# Patient Record
Sex: FEMALE
[De-identification: names, ages, dates, MRNs, and addresses within clinical notes are randomized per-mention and may not be internally consistent; named-entity substitution may affect disease eponyms.]

---

## 2022-01-17 ENCOUNTER — HOSPITAL ENCOUNTER (EMERGENCY)
Dept: HOSPITAL 97 - ER | Age: 67
Discharge: HOME | End: 2022-01-17
Payer: COMMERCIAL

## 2022-01-17 VITALS — OXYGEN SATURATION: 100 % | DIASTOLIC BLOOD PRESSURE: 83 MMHG | SYSTOLIC BLOOD PRESSURE: 165 MMHG | TEMPERATURE: 98 F

## 2022-01-17 DIAGNOSIS — S80.211A: ICD-10-CM

## 2022-01-17 DIAGNOSIS — S20.212A: ICD-10-CM

## 2022-01-17 DIAGNOSIS — S42.202A: ICD-10-CM

## 2022-01-17 DIAGNOSIS — I10: ICD-10-CM

## 2022-01-17 DIAGNOSIS — S27.329A: Primary | ICD-10-CM

## 2022-01-17 DIAGNOSIS — Z88.1: ICD-10-CM
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
## 2022-01-17 DIAGNOSIS — Z88.2: ICD-10-CM

## 2022-01-17 PROCEDURE — 70450 CT HEAD/BRAIN W/O DYE: CPT

## 2022-01-17 PROCEDURE — 99283 EMERGENCY DEPT VISIT LOW MDM: CPT

## 2022-01-17 PROCEDURE — 71045 X-RAY EXAM CHEST 1 VIEW: CPT

## 2022-01-17 NOTE — RAD REPORT
EXAM DESCRIPTION:  RAD - Ribs  Left - 1/17/2022 1:29 pm

 

CLINICAL HISTORY:  Fall, left shoulder and left ribcage pain

 

COMPARISON:  Single-view chest same date, left shoulder same date

 

FINDINGS:  No displaced rib fracture is seen and no non-displaced rib fractures confirmed. Patient ha
s prominent costochondral calcifications. No aggressive rib lesion. No underlying pneumothorax, effus
ion, infiltrate or pulmonary contusion.

 

Proximal left humerus fracture is detailed in separate imaging.

 

IMPRESSION:  No displaced rib fracture present and no nondisplaced rib fracture confirmed.

## 2022-01-17 NOTE — RAD REPORT
EXAM DESCRIPTION:  CT - Head Brain Wo Cont - 1/17/2022 2:57 pm

 

CLINICAL HISTORY:  PAIN

 

COMPARISON:  No comparisonsNo comparisons

 

TECHNIQUE:  Axial 5 mm thick images of the head were obtained without IV contrast.

 

All CT scans are performed using dose optimization technique as appropriate and may include automated
 exposure control or mA/KV adjustment according to patient size.

 

FINDINGS:  No intracranial hemorrhage, mass, edema or shift of mid-line structures. No acute infarcti
on changes seen. No abnormal extra-axial fluid collections. Ventricles are normal. Patient very minim
al cerebral white matter chronic ischemic change. No measurable atrophy

 

Mastoid air cells are clear. Mucosal thickening and air-fluid level present in the right maxillary si
nus with no additional finding that would indicate a trauma etiology. No globe or orbital content abn
ormality.

 

No acute bony findings.

 

 

IMPRESSION:  No acute intracranial finding identifiable.

 

Air-fluid level in the right maxillary sinus probably from sinusitis. No additional findings that wou
ld indicate trauma etiology.

## 2022-01-17 NOTE — RAD REPORT
EXAM DESCRIPTION:  RAD - Shoulder  Left 2 View - 1/17/2022 1:29 pm

 

CLINICAL HISTORY:  PAIN

 

COMPARISON:  Chest Single View dated 1/17/2022; Ribs  Left dated 1/17/2022

 

TECHNIQUE:  AP and transthoracic views of the left shoulder obtained.

 

FINDINGS:  Proximal left humerus fracture is present without dislocation of the humeral head. Fractur
e involves the greater tuberosity laterally with the fracture line tracking medially and inferiorly. 
Medial margin of the fracture is approximately 15 mm below the surgical neck.

 

No pathologic bone changes are seen. Sternoclavicular joint is intact. No abnormal soft tissue calcif
ications.

 

There is minimal angulation of the distal fracture fragment. No measurable distraction or displacemen
t.

 

IMPRESSION:  Proximal left humerus fracture as detailed.

## 2022-01-17 NOTE — EDPHYS
Physician Documentation                                                                           

 [facility]                                                                 

Name: Sally Cantu                                                                                 

Age: 66 yrs                                                                                       

Sex: Female                                                                                       

: 1955                                                                                   

MRN: [id number]                                                                                   

Arrival Date: 2022                                                                          

Time: 10:46                                                                                       

Account#: [id number]                                                                            

Bed 30                                                                                            

Private MD:                                                                                       

ED Physician Azael Andre                                                                      

HPI:                                                                                              

                                                                                             

14:23 This 66 yrs old  Female presents to ER via Ambulatory with complaints of Left   pm1 

      Arm Injury.                                                                                 

14:23 The patient or guardian complains of pain, that is acute. The complaints affect the     pm1 

      left upper arm. Context: The problem was sustained outdoors, resulted from Tripped and      

      fell forward onto her left side. Presenting with pain and abrasion to right knee, left      

      upper arm pain, and left rib pain. Patient reports she also hit her face, knocking off      

      her glasses. She reports LOC. Onset: The symptoms/episode began/occurred just prior to      

      arrival. Treatment prior to arrival includes: sling, by EMS. Modifying factors: The         

      symptoms are alleviated by remaining still. Associated signs and symptoms: Pertinent        

      positives:. Severity of symptoms: in the emergency department the symptoms are              

      unchanged. The patient has not experienced similar symptoms in the past. The patient        

      has not recently seen a physician.                                                          

                                                                                                  

Historical:                                                                                       

- Allergies:                                                                                      

12:25 Cipro;                                                                                  vg1 

12:25 Sulfa (Sulfonamide Antibiotics);                                                        vg1 

- Home Meds:                                                                                      

12:25 losartan oral [Active]; Metformin Oral [Active]; Lovastatin Oral [Active]; Bactrim DS   vg1 

      Oral [Active];                                                                              

- PMHx:                                                                                           

12:25 Hypertensive disorder; Diabetes mellitus;                                               vg1 

- PSHx:                                                                                           

12:25 None;                                                                                   vg1 

                                                                                                  

- Immunization history:: Client reports receiving the 2nd dose of the Covid vaccine.              

- Social history:: Smoking status: Patient denies any tobacco usage or history of.                

                                                                                                  

                                                                                                  

ROS:                                                                                              

14:23 Constitutional: Negative for fever, chills, and weight loss, Eyes: Negative for injury, pm1 

      pain, redness, and discharge, Neck: Negative for injury, pain, and swelling,                

      Cardiovascular: Negative for chest pain, palpitations, and edema, Respiratory: Negative     

      for shortness of breath, cough, wheezing, and pleuritic chest pain, Abdomen/GI:             

      Negative for abdominal pain, nausea, vomiting, diarrhea, and constipation, Back:            

      Negative for injury and pain.                                                               

14:23 MS/extremity: Positive for pain, of the right knee and left upper arm.                      

14:23 Skin: Positive for abrasion(s), of the right knee.                                          

14:23 Neuro: Positive for headache, loss of consciousness, Negative for dizziness, syncope,       

      tingling, weakness.                                                                         

14:23 All other systems are negative.                                                             

                                                                                                  

Exam:                                                                                             

14:23 Constitutional:  This is a well developed, well nourished patient who is awake, alert,  pm1 

      and in no acute distress. Head/Face:  Normocephalic, atraumatic.                            

14:23 Eyes: Exam is negative for acute changes, Periorbital structures: appear normal,            

      Extraocular movements: no acute changes, Sclera: no acute changes, icterus, is not          

      appreciated.                                                                                

14:23 ENT: Exam is negative for acute changes, External ear(s): are unremarkable, Ear             

      canal(s): are normal, TM's: are normal.                                                     

14:23 Cardiovascular: Exam negative for  acute changes, Rate: normal, Rhythm: regular,            

      Pulses: no pulse deficits are appreciated.                                                  

14:23 Respiratory: Exam negative for  acute changes, respiratory distress, shortness of           

      breath.                                                                                     

14:23 Musculoskeletal/extremity: Exam is negative for acute changes, Extremities: grossly         

      normal except: noted in the left upper arm: tenderness, Pulses: noted to be 2+ in the       

      left radial artery, the  left hand Sensation intact.                                        

14:23 Skin: Appearance: normal except for affected area, injury, abrasion(s), small abrasion      

      noted, of the right knee.                                                                   

14:23 Neuro: Exam negative for acute changes, Orientation: is normal, Mentation: is normal,       

      Motor: is normal, moves all fours.                                                          

                                                                                                  

Vital Signs:                                                                                      

12:22  / 83; Pulse 88; Resp 16; Temp 98.0; Pulse Ox 100% ; Weight 62.6 kg; Height 5 ft. vg1 

      4 in. (162.56 cm); Pain 10/10;                                                              

12:22 Body Mass Index 23.69 (62.60 kg, 162.56 cm)                                             vg1 

                                                                                                  

MDM:                                                                                              

14:20 Data reviewed: vital signs. Data interpreted: Pulse oximetry: on room air is 100 %.     pm1 

      Interpretation: normal.                                                                     

14:22 Patient medically screened.                                                             pm1 

15:08 Counseling: I had a detailed discussion with the patient and/or guardian regarding: the pm1 

      historical points, exam findings, and any diagnostic results supporting the                 

      discharge/admit diagnosis, radiology results, the need for outpatient follow up, a          

      orthopedic surgeon, to return to the emergency department if symptoms worsen or persist     

      or if there are any questions or concerns that arise at home.                               

                                                                                                  

                                                                                             

12:30 Order name: Knee Right 3 View XRAY; Complete Time: 13:50                                vg1 

                                                                                             

12:30 Order name: Shoulder Left (2 View) XRAY; Complete Time: 13:50                           vg1 

                                                                                             

12:30 Order name: Ribs Left XRAY; Complete Time: 13:50                                        vg1 

                                                                                             

12:30 Order name: Chest Single View XRAY; Complete Time: 13:50                                vg1 

                                                                                             

14:22 Order name: CT Head Brain wo Cont; Complete Time: 15:08                                 pm1 

                                                                                             

14:22 Order name: Shoulder Immobilizer; Complete Time: 14:30                                  pm1 

                                                                                                  

Administered Medications:                                                                         

14:28 Drug: Norco (HYDROcodone-acetaminophen) 10 mg-325 mg 1 tabs Route: PO;                  jh5 

                                                                                                  

                                                                                                  

Disposition:                                                                                      

                                                                                             

07:46 Co-signature as Attending Physician, Azael Andre MD I agree with the assessment and  angelito 

      plan of care.                                                                               

                                                                                                  

Disposition Summary:                                                                              

22 15:10                                                                                    

Discharge Ordered                                                                                 

      Location: Home                                                                          pm1 

      Problem: new                                                                            pm1 

      Symptoms: have improved                                                                 pm1 

      Condition: Stable                                                                       pm1 

      Diagnosis                                                                                   

        - Fall on same level from slipping, tripping and stumbling without subsequent         pm1 

      striking against object                                                                     

        - Fracture of upper end of humerus                                                    pm1 

        - Abrasion, right knee                                                                pm1 

        - Contusion of left front wall of thorax                                              pm1 

      Followup:                                                                               pm1 

        - With: Emergency Department                                                               

        - When: As needed                                                                          

        - Reason: Worsening of condition                                                           

      Followup:                                                                               pm1 

        - With: Private Physician                                                                  

        - When: 2 - 3 days                                                                         

        - Reason: Recheck today's complaints, Continuance of care, Re-evaluation by your           

      physician                                                                                   

      Followup:                                                                               pm1 

        - With: Vivek Reyez MD                                                              

        - When: 2 - 3 days                                                                         

        - Reason: Recheck today's complaints, Continuance of care, Re-evaluation by your           

      physician                                                                                   

      Discharge Instructions:                                                                     

        - Discharge Summary Sheet                                                             pm1 

        - Abrasion                                                                            pm1 

        - Rib Contusion                                                                       pm1 

        - Humerus Fracture Treated With Immobilization                                        pm1 

        - How to Use a Shoulder Immobilizer                                                   pm1 

      Forms:                                                                                      

        - Medication Reconciliation Form                                                      pm1 

        - Thank You Letter                                                                    pm1 

        - Antibiotic Education                                                                pm1 

        - Prescription Opioid Use                                                             pm1 

        - Work release form                                                                   pm1 

      Prescriptions:                                                                              

        - Tylenol-Codeine #3 300 mg-30 mg Oral                                                     

            - take 2 tablet by ORAL route every 6 hours As needed; 20 tablet; Refills: 0,     pm1 

      Product Selection Permitted                                                                 

Signatures:                                                                                       

Dispatcher [employer]                           Azael Justin MD MD cha Marinas, Patrick, NP                    NP   pm1                                                  

Lisandra Poon, RN                    RN   vg1                                                  

Princess Main RN                       RN   jh5                                                  

                                                                                                  

**************************************************************************************************

## 2022-01-17 NOTE — RAD REPORT
EXAM DESCRIPTION:  RAD - Chest Single View - 1/17/2022 1:29 pm

 

CLINICAL HISTORY:  RIB PAIN - LEFT, fall, trauma history

 

COMPARISON:  No remote imaging

 

TECHNIQUE:  AP portable chest image was obtained 1/17/2022 1:29 pm .

 

FINDINGS:  Lungs appear fibrotic but no pulmonary contusion or acute lung parenchymal process seen. H
eart and vasculature are normal. No measurable pleural effusion and no pneumothorax. No acute rib cag
e abnormality though detail is limited. Concerns for rib fracture can be addressed with dedicated lance
ging. Proximal left humerus fracture is separately detailed. No acute aortic findings suspected.

 

IMPRESSION:  Fibrotic lung findings with no pulmonary contusion or pneumothorax.

 

Concerns for rib fracture can be addressed with dedicated imaging.

## 2022-01-17 NOTE — XMS REPORT
Continuity of Care Document

                           Created on:2022



Patient:CANTU, SALLY MAURICIO

Sex:Female

:1955

External Reference #:[id number]





Demographics







                          Address                   [address] [address]

 

                          Home Phone                [phone]

 

                          Work Phone                [phone]

 

                          Mobile Phone              [phone]

 

                          Email Address             [email or web address]

 

                          Preferred Language        English

 

                          Marital Status            Unknown

 

                          Caodaism Affiliation     Unknown

 

                          Race                      Unknown

 

                          Additional Race(s)        Unavailable



                                                    White

 

                          Ethnic Group               or 









Author







                          Organization              [facility]

t

 

                          Address                   [address]



                                                    [place] [address]

 

                          Phone                     [phone]









Support







                Name            Relationship    Address         Phone

 

                CANTU           Unavailable     [address]       [phone]



                                                [place] [address] 

 

                CANTU           Unavailable     [address]       [phone]



                                                [place] [address] 

 

                Cantu           Spouse          [address]       [phone]



                                                [place] [address] 









Care Team Providers







                    Name                Role                Phone

 

                    Jeffrey Espinosa MD Primary Care Physician [phone]

 

                    Jeffrey Espinosa MD Attending Clinician [phone]

 

                    Naveed JIMENEZ  A       Attending Clinician [phone]

 

                    Green FNP           Attending Clinician [phone]

 

                    Lab,  Fam Pob I     Attending Clinician Unavailable

 

                    Deborah FNP           Attending Clinician [phone]

 

                    Provider,  Urgent Care Attending Clinician Unavailable

 

                    Pob,  Lab Main      Attending Clinician Unavailable

 

                    Doctor Unassigned,  Name Attending Clinician Unavailable

 

                    UNDEFINED           Admitting Clinician Unavailable









Payers







           Payer Name Policy Type Policy Number Effective Date Expiration Date S

ource







Problems







       Condition Condition Condition Status Onset  Resolution Last   Treating Co

mments 

Source



       Name   Details Category        Date   Date   Treatment Clinician        



                                                 Date                 

 

       Elevated Elevated Disease Active                              Unive

rs



       liver  liver                7-                               ity of



       enzymes enzymes               00:00:                             [address]

 

       Allergic Allergic Disease Active 2015                             Unive

rs



       rhinitis rhinitis               2-                               ity of



                                   00:00:                             [address]

 

       Hyperlipid Hyperlipid Disease Active 2015                             U

nivers



       emia   emia                 2-                               ity of



                                   00:00:                             [address]

 

       Essential Essential Disease Active 2015                             Uni

vers



       hypertensi hypertensi               2-03                               it

y of



       on     on                   00:00:                             [place]



                                   00                                 Medical



                                                                      Branch

 

       Type 2 Type 2 Disease Active 2015                             [facility]



       diabetes diabetes               2-                               ity of



       mellitus mellitus               00:00:                             [place]



                                   00                                 Medical



                                                                      Branch







Allergies, Adverse Reactions, Alerts







       Allergy Allergy Status Severity Reaction(s) Onset  Inactive Treating Comm

ents 

Source



       Name   Type                        Date   Date   Clinician        

 

       josue DA     Active MI     HIVES                        [facility]



       xacin                              2-                        [place]



                                          00:00:                      d



                                          00                          Medical



                                                                      Center

 

       ciproflo DA     Active MI                                  [facility]



       xacin                              2-                        [place]



                                          00:00:                      d



                                          00                          Medical



                                                                      Center

 

       Sulfamet Propensi Active        Hives  2016                      Univer

s



       hoprim ty to                                               ity of



       Ds     adverse                      00:00:                      [place]



              reaction                                                Medical



              s                                                       Branch







Social History







           Social Habit Start Date Stop Date  Quantity   Comments   Source

 

           Exposure to                       Not sure              [facility]-CoV-2                                             [facility]



           (event)                                                Branch

 

           Alcohol intake 2021 Current               [facility]



                      00:00:00   00:00:00   non-drinker of            [place] [facility]

david



                                            alcohol               [facility]



                                            (finding)             

 

           Tobacco use and 2015 Never used            Univers[facility]

y of



           exposure   00:00:00   00:00:00                         [facility]

 

           Sex Assigned At 1955                       Universit

y of



           Birth      00:00:00   00:00:00                         [facility]









                Smoking Status  Start Date      Stop Date       Source

 

                Never smoker                                    [facility]







Medications







       Ordered Filled Start  Stop   Current Ordering Indication Dosage Frequency

 Signature

                    Comments            Components          Source



     Medication Medication Date Date Medication? Clinician                (SIG) 

          



     Name Name                                                   

 

     cefUROXime      2021      Yes       [id number] 250mg      Take 1           

Univers



     250 mg      2-23                               tablet by           ity of



     tablet      00:00:                               mouth 2           [place]



                                                (two)           Medical



                                                  times           Branch



                                                  daily.           

 

     METFORMIN            Yes       [id number] 1000mg      TAKE 1          

 Univers



     1,000 mg      9-08                               TABLET BY           ity of



     tablet      00:00:                               MOUTH 2           [place]



                                                (TWO)           Medical



                                                  TIMES           Branch



                                                  DAILY WITH           



                                                  MEALS.           



                                                  FOLLOW-UP           



                                                  FOR            



                                                  REFILLS/LA           



                                                  BS             

 

     cefUROXime      2021- No        [id number] 250mg      Take 1          

 Univers



     250 mg      7- 12-                          tablet by           ity of



     tablet      00:00: 00:00                          mouth 2           [place]



               00   :00                           (two)           Medical



                                                  times           Branch



                                                  daily.           

 

     naproxen      2021-0      Yes            550mg      Take 550           Univ

ers



     sodium 550      2-09                               mg by           ity of



     mg tablet      00:00:                               mouth 2           [place]



               00                                 (two)           [facility]



                                                  daily as           



                                                  needed.           

 

     SITagliptin      -      Yes       [id number] 50mg      Take 1          

 Univers



     (JANUVIA)      8-06                               tablet by           ity o

f



     50 mg      00:00:                               mouth           [place]



     tablet      00                                 daily.           Medical



                                                                 Branch

 

     lovastatin      -      Yes       [id number] 20mg      Take 1           

Univers



     20 mg      8-06                               tablet by           ity of



     tablet      00:00:                               mouth at           [place]



               00                                 bedtime.           Medical



                                                                 Branch

 

     irbesartan      -      Yes       [id number] 150mg      Take 1           

Univers



     150 mg      8-06                               tablet by           ity of



     tablet      00:00:                               mouth           [place]



               00                                 daily.           [facility]







Immunizations







           Ordered    Filled Immunization Date       Status     Comments   Sourc

e



           Immunization Name Name                                        

 

           SARS-COV-2 COVID-19            2021 Completed             Unive

rsity of



           PFIZER VACCINE            00:00:00                         [facility]

 

           SARS-COV-2 COVID-19            2021 Completed             Unive

rsity of



           PFIZER VACCINE            00:00:00                         [facility]

 

           Influenza High Dose            2021 Completed             Unive

rsity of



                                 00:00:00                         [facility]

 

           Influenza High Dose            2019-10-12 Completed             Unive

rsity of



                                 00:00:00                         [facility]







Vital Signs







             Vital Name   Observation Time Observation Value Comments     Source

 

             Systolic blood 2021 15:35:00 149 mm[Hg]                Univer

sity of



             pressure                                            [facility]

 

             Diastolic blood 2021 15:35:00 75 mm[Hg]                 Unive

rsity of



             pressure                                            [facility]

 

             Heart rate   2021 15:34:00 76 /min                   [facility]

 

             Body temperature 2021 15:34:00 36.83 Hanh                 [facility]

 

             Body height  2021 15:34:00 162.6 cm                  [facility]

 

             Body weight  2021 15:34:00 60.782 kg                 [facility]

 

             BMI          2021 15:34:00 23.00 kg/m2               [facility]







Procedures







                Procedure       Date / Time Performed Performing Clinician Sourc

e

 

                POCT URINALYSIS 2021 00:00:00 Franko Espinosa [facility]







Encounters







        Start   End     Encounter Admission Attending Care    Care    Encounter 

Source



        Date/Time Date/Time Type    Type    Clinicians Facility Department ID   

   

 

        2021         Inpatient                 [facility]   YUAN    [id number] 

[facility]



        08:50:00                                                 [id number]  [facility]

 

        2021 Office          [facility]    1.2.840.114 [id number]

[id number] [address]



        09:00:00 09:54:52 Visit           [facility]  350.1.13.10         it

y of



                                        [facility] [id number]         [place]

as



                                                ELLEN?BLEA [phone]         Me

jdal



                                                [address]                 



                                                OFFICE                  



                                                BUILDING                 

 

        2020 Refill          [facility]    1.2.840.114 [id number]

[id number] 



        00:00:00 00:00:00                 [facility]  350.1.13.10         



                                        [facility] [id number]         



                                                Professio [phone]         



                                                [place] [address]             



                                                Office                  



                                                Building                 



                                                One                     

 

        2020 Telephone         [facility]    1.2.840.114 [id number]

[id number] 



        00:00:00 00:00:00                 [facility]  350.1.13.10         



                                        [facility] [id number]         



                                                Professio [phone]         



                                                [place] [address]             



                                                Office                  



                                                [facility]                 



                                                One                     

 

        2020 [facility]    1.2.840.114 [id number]

[id number] 



        00:00:00 00:00:00                 Franko   [place]   350.1.13.10         



                                        [facility] [id number]         



                                                        [phone]         



                                                        019             

 

        2020 [facility]    1.2.840.114 [id number]

[id number] 



        00:00:00 00:00:00                 [facility]  350.1.13.10         



                                        Edward  [place] [id number]         



                                                Professio [phone]         



                                                [place] [address]             



                                                Office                  



                                                Building                 



                                                One                     

 

        2020 [facility]    1.2.840.114 [id number]

[id number] 



        00:00:00 00:00:00                 [facility] 350.1.13.10         



                                        [facility] 4.2.7.2.686         



                                                [place] [phone]         



                                                [address]                 

 

        2020 Telemedici         [facility]    1.2.840.114 [id number]

[id number] 



        08:18:19 08:33:19 ne Visit         Franko Durand 350.1.13.10         



                                        Jeffrey Dicksonbury 4.2.7.2.686         



                                                [place] [phone]         



                                                [address]                 

 

        2020 Refill          Naveed, [facility]    1.2.840.114 [id number]

[id number] 



        00:00:00 00:00:00                 [facility]  350.1.13.10         



                                                [place] 4.2.7.2.686         



                                                Professio [phone]         



                                                [place] [address]             



                                                Office                  



                                                Building                 



                                                One                     

 

        2020 Telephone         Naveed, [facility]    [id number]

[id number] 



        00:00:00 00:00:00                 [facility]  350.1.13.10         



                                                [place] 4.2.7.2.686         



                                                Professio [phone]         



                                                [place] [address]             



                                                Office                  



                                                Building                 



                                                One                     

 

        2020 Telephone         Jamaal  [facility]    [id number]

[id number] 



        00:00:00 00:00:00                 [facility]   [facility]  350.1.13.10         



                                                [place] 4.2.7.2.686         



                                                Professio [phone]         



                                                [place] [address]             



                                                Office                  



                                                Building                 



                                                One                     

 

        2020 Laboratory         Lab, [facility]    1.2.840.114 [id number]

[id number] 



        07:10:30 07:30:30 Only            [facility]  350.1.13.10         



                                                [place] 4.2.7.2.686         



                                                [place]fess[facility] [phone]         



                                                [place] [address]             



                                                Office                  



                                                Building                 



                                                One                     

 

        2020 Refill          Naveed, [facility]    1.2.840.114 [id number]

[id number] 



        00:00:00 00:00:00                 [facility]  350.1.13.10         



                                                [place] 4.2.7.2.686         



                                                Professio [phone]         



                                                [place] [address]             



                                                Office                  



                                                Building                 



                                                One                     

 

        2020-07-10 2020-07-10 Telephone         LULU Cabrera    [id number]

[id number] 



        00:00:00 00:00:00                 [facility]   350.1.13.10         



                                                [facility]PITAL 4.2.7.2.686         



                                                        [phone]         



                                                        019             

 

        2020 Laboratory         Lab, [facility]    1.2.840.114 [id number]

[id number] 



        10:24:38 10:44:38 Only            [facility]  350.1.13.10         



                                                [place] 4.2.7.2.686         



                                                Professio [phone]         



                                                [place] [address]             



                                                Office                  



                                                Building                 



                                                One                     

 

        2020 Patient         Naveed, [facility]    1.2.840.114 [id number]

[id number] 



        00:00:00 00:00:00 Secure Msg         [facility] [place] 350.1.13.10      

   



                                                [place] 4.2.7.2.686         



                                                Professio [phone]         



                                                [address]                 

 

        2020 Urgent          Provider, [facility]    [id number]

[id number] 



        13:26:21 17:20:52 Care            [facility]  350.1.13.10        

 



                                        Care    [place] 4.2.7.2.686         



                                                Professio [phone]         



                                                [place] [address]             



                                                Office                  



                                                Building                 



                                                One                     

 

        2020 Technician         Tiny, [facility]    1.2.840.114 [id number]

[id number] 



        14:42:28 14:57:28 Visit           Lab Main [place] 350.1.13.10         



                                                [place] 4.2.7.2.686         



                                                Professio [phone]         



                                                [address]                 

 

        2020 Refill          Naveed, [facility]    1.2.840.114 [id number]

[id number] 



        00:00:00 00:00:00                 [facility]  350.1.13.10         



                                                [place] 4.2.7.2.686         



                                                Professio [phone]         



                                                [place] [address]             



                                                Office                  



                                                Building                 



                                                One                     

 

        2020 Refill          Naveed, [facility]    1.2.840.114 [id number]

04 



        00:00:00 00:00:00                 [facility]  350.1.13.10         



                                                [place] 4.2.7.2.686         



                                                [facility] [phone]         



                                                [place] [address]             



                                                Office                  



                                                Building                 



                                                One                     

 

        2020 Refill          Naveed, [facility]    1.2.840.114 [id number]

86 



        00:00:00 00:00:00                 [facility]  350.1.13.10         



                                                [place] 4.2.7.2.686         



                                                [facility] [phone]         



                                                [place] [address]             



                                                Office                  



                                                Building                 



                                                One                     

 

        2020 Refill          Naveed, [facility]    1.2.840.114 [id number]

49 



        00:00:00 00:00:00                 [facility]  350.1.13.10         



                                                [place] 4.2.7.2.686         



                                                [facility] [phone]         



                                                [place] [address]             



                                                Office                  



                                                Building                 



                                                One                     

 

        2020 Refill          Naveed, [facility]    1.2.840.114 [id number]

33 



        00:00:00 00:00:00                 [facility]  350.1.13.10         



                                                [place] 4.2.7.2.686         



                                                [facility] [phone]         



                                                [place] [address]             



                                                Office                  



                                                Building                 



                                                One                     

 

        2020 Refill          Naveed, [facility]    1.2.840.114 [id number]

71 



        00:00:00 00:00:00                 [facility]  350.1.13.10         



                                                [place] 4.2.7.2.686         



                                                [facility] [phone]         



                                                [place] [address]             



                                                Office                  



                                                Building                 



                                                One                     

 

        2020 Refill          Naveed, [facility]    1.2.840.114 [id number]

43 



        00:00:00 00:00:00                 [facility]  350.1.13.10         



                                                [place] 4.2.7.2.686         



                                                [facility] [phone]         



                                                [place] [address]             



                                                Office                  



                                                Building                 



                                                One                     

 

        2020-01-15 2020-01-15 Refill          Naveed, [facility]    1.2.840.114 [id number]

00 



        00:00:00 00:00:00                 [facility]  350.1.13.10         



                                                [place] 4.2.7.2.686         



                                                [facility] [phone]         



                                                [place] [address]             



                                                Office                  



                                                Building                 



                                                One                     

 

        2019 Orders          Doctor  [facility]    1.2.840.114 [id number]

21 



        00:00:00 00:00:00 Only            Unassigned, MARGARITA   350.1.13.10       

  



                                        [facility] 4.2.7.2.686         



                                                        [phone]         



                                                        009             







Results







           Test Description Test Time  Test Comments Results    Result Comments 

Source









                    POCT URINALYSIS W SPECIFIC GRAVITY 2021 15:56:00 









                      Test Item  Value      Reference Range Interpretation Comme

nts









             POCT U SP GRAV (test code = 3255) 1.015 mg/dl  1.005-1.025         

      

 

             POCT PH U (test code = 3254) 6 mg/dl      5-8                      

 

 

             POCT U LEUK EST (test code = 3263) ++           Negative - Negative

              

 

             POCT U NIT (test code = 3262) neg          Negative - Negative     

         

 

             POCT U PROT (test code = 3259) trace        Negative - Negative    

          

 

             POCT U GLU (test code = 3256) normal       Negative - Negative     

         

 

             POCT U KETONE (test code = 3258) neg          Negative - Negative  

            

 

             POCT U UROBILI (test code = 3260) normal       0.2-1               

      

 

             POCT U BILI (test code = 3261) neg          Negative - Negative    

          

 

             POCT U BLD (test code = 3257) trace        Negative - Negative     

         

 

             POCT U COLOR (test code = 3266) dark                               

    

 

             POCT U APPEAR (test code = 3267) clear                             

     



[facility]- XR L-SPINE 2/3 [id number] 10:11:00
************************************************************[facility]         : 1955        Sex: 
F************************************************************ Name: CANTU,SALLY 
                       [facility]                      : 1955 Age/S:
 65  / F         [id number] [place]              Unit #: [id number]     Loc:    
           [place] [address]                Phys[facility] Briscoe  [id number]  Dis Date:               Status: REG ER                            
      PHONE #:[phone]     Exam Date: 2021  1007                     
FAX #:                  Reason: trauma                                          
   EXAMS:                                               CPT:           [id number]
 XR L-SPINE 2/3 VIEWS                       25963             Fluoro Time:      
  DAP (Gy m2):          Air Kerma (mGy):              EXAM:  - XR L-SPINE 2/3 
VIEWS   HISTORY: trauma               S17               COMPARISON: None 
available time of interpretation.               FINDINGS:       AP, lateral, and
 spot lateral views of the lumbar spine are provided.               There is no 
acute fracture or malalignment.  Vertebral body heights       and sagittal 
alignment are maintained. Posterior elements are aligned       and intact. No 
locked or perched facet. Scattered areas of       degenerative disc disease and 
facet arthropathy is present at multiple    levels.               The 
paravertebral soft tissues are unremarkable.               The visualized SI 
joints are unremarkable.                 IMPRESSION:         No evidence of 
acute fracture or malalignment of the lumbar spine                ** 
Electronically Signed by ROSA Gna **        **                
on 2021 at 1011                **                      Reported and signed
 by: Katherine Gan M.D.                    CC: Case Briscoe MD   
                                                                                
                 PAGE  1                       Signed Report                    
             Name: CANTU,SALLY               [facility]                      
: 1955 Age/S: 65  / F         [id number] [place]              Unit #: 
[id number]     Loc:               [place] [address]                Phys: 
Case Briscoe MD                                              Acct: 
[id number]  Dis Date:            Status: REG ER                               
   PHONE #: [phone]     Exam Date: 2021  1007                     FAX
 #:                  Reason: trauma               EXAMS:                        
                       CPT:           [id number] XR L-SPINE 2/3 VIEWS            
           33047             Fluoro Time:            DAP (Gy m2):          Air 
Kerma (mGy):         &lt;Continued&gt; Technologist: Eunice Coe RT(R)(CT)      
   Trnscb Date/Time: 2021 (1011) [id number]                        Orig 
Print D/T: S: 2021 (1014)                                             PAGE
  2                       Signed Report

## 2022-01-17 NOTE — RAD REPORT
EXAM DESCRIPTION:  RAD - Knee Right 3 View - 1/17/2022 1:29 pm

 

CLINICAL HISTORY:  PAIN

 

COMPARISON:  No comparisons

 

FINDINGS:  No fracture, dislocation or periosteal reaction.No joint effusion seen. Minimal spurring i
s seen at the quadriceps attachment to the patella. No joint space narrowing. No foreign body or othe
r soft tissue abnormality.

 

 

IMPRESSION:  Negative right knee for acute bone or joint finding.

 

Clinical concerns for internal derangement or occult bony injury could be further assessed with MR im
aging.

## 2022-01-17 NOTE — ER
Nurse's Notes                                                                                     

 [facility]                                                                 

Name: Sally Cantu                                                                                 

Age: 66 yrs                                                                                       

Sex: Female                                                                                       

: 1955                                                                                   

MRN: [id number]                                                                                   

Arrival Date: 2022                                                                          

Time: 10:46                                                                                       

Account#: [id number]                                                                            

Bed 30                                                                                            

Private MD:                                                                                       

Diagnosis: Fall on same level from slipping, tripping and stumbling without subsequent striking   

  against object;Fracture of upper end of humerus;Abrasion, right knee;Contusion of               

  left front wall of thorax                                                                       

                                                                                                  

Presentation:                                                                                     

                                                                                             

12:22 Chief complaint: Patient states: Pt was walking out of [facility] and tripped and fell onto   vg1 

      left side. States Left shoulder pain, Left side of ribs and Right knee. States hit head     

      with LOC. Coronavirus screen: Vaccine status: Patient reports receiving the 2nd dose of     

      the covid vaccine. Client denies travel out of the [place] in the last 14 days. Ebola          

      Screen: Patient negative for fever greater than or equal to 101.5 degrees Fahrenheit,       

      and additional compatible Ebola Virus Disease symptoms. Initial Sepsis Screen: Does the     

      patient meet any 2 criteria? No. Patient's initial sepsis screen is negative. Does the      

      patient have a suspected source of infection? No. Patient's initial sepsis screen is        

      negative. Risk Assessment: Do you want to hurt yourself or someone else? Patient            

      reports no desire to harm self or others. Onset of symptoms was 2022.           

12:22 Method Of Arrival: Ambulatory                                                           vg1 

12:22 Acuity: RAEGAN 3                                                                           vg1 

                                                                                                  

Triage Assessment:                                                                                

12:25 General: Appears in no apparent distress. uncomfortable, Behavior is calm, cooperative. vg1 

      Pain: Complains of pain in Left shoulder, left side of ribs, and Right knee.                

      Musculoskeletal: Circulation, motion, and sensation intact.                                 

                                                                                                  

Historical:                                                                                       

- Allergies:                                                                                      

12:25 Cipro;                                                                                  vg1 

12:25 Sulfa (Sulfonamide Antibiotics);                                                        vg1 

- Home Meds:                                                                                      

12:25 losartan oral [Active]; Metformin Oral [Active]; Lovastatin Oral [Active]; Bactrim DS   vg1 

      Oral [Active];                                                                              

- PMHx:                                                                                           

12:25 Hypertensive disorder; Diabetes mellitus;                                               vg1 

- PSHx:                                                                                           

12:25 None;                                                                                   vg1 

                                                                                                  

- Immunization history:: Client reports receiving the 2nd dose of the Covid vaccine.              

- Social history:: Smoking status: Patient denies any tobacco usage or history of.                

                                                                                                  

                                                                                                  

Screenin:29 Abuse screen: Denies threats or abuse. Denies injuries from another. Nutritional        jh5 

      screening: No deficits noted. Tuberculosis screening: No symptoms or risk factors           

      identified. Fall Risk None identified.                                                      

                                                                                                  

Assessment:                                                                                       

14:29 General: Appears in no apparent distress. well groomed, well developed, well nourished, 5 

      Behavior is calm, cooperative, appropriate for age. Neuro: No deficits noted.               

      Cardiovascular: No deficits noted. Respiratory: No deficits noted.                          

                                                                                                  

Vital Signs:                                                                                      

12:22  / 83; Pulse 88; Resp 16; Temp 98.0; Pulse Ox 100% ; Weight 62.6 kg; Height 5 ft. vg1 

      4 in. (162.56 cm); Pain 10/10;                                                              

12:22 Body Mass Index 23.69 (62.60 kg, 162.56 cm)                                             vg1 

                                                                                                  

ED Course:                                                                                        

10:46 Patient arrived in ED.                                                                  am2 

12:25 Triage completed.                                                                       vg1 

12:25 Arm band placed on.                                                                     vg1 

13:29 Knee Right 3 View XRAY In Process Unspecified.                                          EDMS

13:29 Shoulder Left (2 View) XRAY In Process Unspecified.                                     EDMS

13:30 Ribs Left XRAY In Process Unspecified.                                                  EDMS

13:30 Chest Single View XRAY In Process Unspecified.                                          EDMS

14:20 Cornel Stephen NP is PHCP.                                                           pm1 

14:20 Azael Andre MD is Attending Physician.                                             pm1 

14:24 Princess Main RN is Primary Nurse.                                                     jh5 

14:29 Patient has correct armband on for positive identification. Call light in reach. Side   5 

      rails up X 1.                                                                               

14:29 No provider procedures requiring assistance completed. Patient did not have IV access   [facility] 

      during this emergency room visit.                                                           

14:56 CT Head Brain wo Cont In Process Unspecified.                                           EDMS

15:19 Vivek Reyez MD is Referral Physician.                                            pm1 

                                                                                                  

Administered Medications:                                                                         

14:28 Drug: Norco (HYDROcodone-acetaminophen) 10 mg-325 mg 1 tabs Route: PO;                  jh5 

                                                                                                  

                                                                                                  

Outcome:                                                                                          

15:10 Discharge ordered by MD.                                                                pm1 

15:41 Patient left the ED.                                                                    ll1 

                                                                                                  

Signatures:                                                                                       

Dispatcher [employer]                           EDMS                                                 

Cornel Stephen NP                    NP   pm1                                                  

Iliana Bauer                               am2                                                  

Cleve, Lisandra, RN                    RN   vg1                                                  

Rach Calloway, RN                       RN   ll1                                                  

Princess Main, RN                       RN   jh5                                                  

                                                                                                  

**************************************************************************************************